# Patient Record
Sex: FEMALE | Race: WHITE | NOT HISPANIC OR LATINO | Employment: UNEMPLOYED | URBAN - NONMETROPOLITAN AREA
[De-identification: names, ages, dates, MRNs, and addresses within clinical notes are randomized per-mention and may not be internally consistent; named-entity substitution may affect disease eponyms.]

---

## 2017-07-07 ENCOUNTER — APPOINTMENT (OUTPATIENT)
Dept: MEDICAL GROUP | Facility: PHYSICIAN GROUP | Age: 57
End: 2017-07-07
Payer: MEDICAID

## 2017-10-09 ENCOUNTER — OFFICE VISIT (OUTPATIENT)
Dept: CARDIOLOGY | Facility: PHYSICIAN GROUP | Age: 57
End: 2017-10-09
Payer: MEDICAID

## 2017-10-09 VITALS
SYSTOLIC BLOOD PRESSURE: 94 MMHG | HEIGHT: 63 IN | HEART RATE: 78 BPM | BODY MASS INDEX: 20.2 KG/M2 | DIASTOLIC BLOOD PRESSURE: 60 MMHG | OXYGEN SATURATION: 100 % | WEIGHT: 114 LBS

## 2017-10-09 DIAGNOSIS — R55 SYNCOPE AND COLLAPSE: Chronic | ICD-10-CM

## 2017-10-09 DIAGNOSIS — Z72.0 TOBACCO USE: Chronic | ICD-10-CM

## 2017-10-09 DIAGNOSIS — R00.2 PALPITATIONS: ICD-10-CM

## 2017-10-09 DIAGNOSIS — I45.6 PRE-EXCITATION ATRIOVENTRICULAR CONDUCTION: Chronic | ICD-10-CM

## 2017-10-09 PROCEDURE — 99244 OFF/OP CNSLTJ NEW/EST MOD 40: CPT | Performed by: INTERNAL MEDICINE

## 2017-10-09 RX ORDER — CETIRIZINE HYDROCHLORIDE 10 MG/1
TABLET ORAL
COMMUNITY
Start: 2017-10-05

## 2017-10-09 RX ORDER — BUTALBITAL, ACETAMINOPHEN AND CAFFEINE 300; 40; 50 MG/1; MG/1; MG/1
CAPSULE ORAL
COMMUNITY
Start: 2014-12-03

## 2017-10-09 RX ORDER — ALENDRONATE SODIUM 70 MG/1
TABLET ORAL
COMMUNITY
Start: 2017-10-05

## 2017-10-09 RX ORDER — PROPRANOLOL HYDROCHLORIDE 20 MG/1
TABLET ORAL
COMMUNITY
Start: 2017-08-15

## 2017-10-09 RX ORDER — ALPRAZOLAM 0.25 MG/1
TABLET ORAL
COMMUNITY
Start: 2017-08-15

## 2017-10-09 RX ORDER — ATORVASTATIN CALCIUM 40 MG/1
TABLET, FILM COATED ORAL
COMMUNITY
Start: 2017-09-15

## 2017-10-09 RX ORDER — OXYBUTYNIN CHLORIDE 5 MG/1
2.5 TABLET ORAL
COMMUNITY

## 2017-10-09 RX ORDER — AMITRIPTYLINE HYDROCHLORIDE 10 MG/1
TABLET, FILM COATED ORAL
COMMUNITY
Start: 2017-09-15

## 2017-10-09 RX ORDER — NORTRIPTYLINE HYDROCHLORIDE 25 MG/1
25 CAPSULE ORAL
COMMUNITY
Start: 2015-03-02

## 2017-10-09 RX ORDER — HYDROCODONE BITARTRATE AND ACETAMINOPHEN 5; 325 MG/1; MG/1
TABLET ORAL
COMMUNITY
Start: 2017-09-15

## 2017-10-09 RX ORDER — ALBUTEROL SULFATE 90 MCG
HFA AEROSOL WITH ADAPTER (GRAM) INHALATION
COMMUNITY
Start: 2017-10-05

## 2017-10-09 RX ORDER — LACTULOSE 10 G/15ML
SOLUTION ORAL; RECTAL
COMMUNITY
Start: 2017-10-05

## 2017-10-09 RX ORDER — OXYBUTYNIN CHLORIDE 5 MG/1
TABLET ORAL
COMMUNITY
Start: 2017-10-05

## 2017-10-09 ASSESSMENT — ENCOUNTER SYMPTOMS
PHOTOPHOBIA: 1
COUGH: 0
PND: 0
NECK PAIN: 1
HEARTBURN: 1
NAUSEA: 1
BLURRED VISION: 1
WEIGHT LOSS: 1
BACK PAIN: 1
FOCAL WEAKNESS: 0
CHILLS: 0
CLAUDICATION: 0
FALLS: 1
BRUISES/BLEEDS EASILY: 0
SORE THROAT: 0
DIZZINESS: 1
CONSTIPATION: 1
WEAKNESS: 1
FEVER: 0
HEADACHES: 1
PALPITATIONS: 0
ABDOMINAL PAIN: 0
POLYDIPSIA: 1
LOSS OF CONSCIOUSNESS: 1
SHORTNESS OF BREATH: 0

## 2017-10-09 NOTE — LETTER
[facility] [address]  Phone: [phone]  Fax: [phone]              Deirdre Benton  1960    Encounter Date: 10/9/2017    Miguel Hagan M.D.          PROGRESS NOTE:  Subjective:   Deirdre Benton is a 57 y.o. female who presents today In consultation per Dr. Ferrer's office for evaluation of what she describes a loss of conscious for a couple of days.  Unfortunately she has some confusion on what been evaluated in the past and what her presentation was but had reported that she felt she passed out for a couple days and then had some slurred speech she believes she had an MRI of the brain does not know the results she was recommended to start atorvastatin and metoprolol recent office visit but does not recall his medications despite being on her medication list I believe she did not fill those.    She continues to have a little bit of confusion about her overall events but reports that her neurologic status is back to baseline    [place] is [place] her testing is requiring her to travel some distance    Past Medical History:   Diagnosis Date   • Meniere's disease of both ears 1990   • Arthritis    • Dental disorder     dentures   • EMPHYSEMA    • Pre-excitation atrioventricular conduction    • Syncope and collapse    • Tobacco use    • Unspecified urinary incontinence      Past Surgical History:   Procedure Laterality Date   • PTOSIS REPAIR  12/14/2012    Performed by Kiel Prado M.D. at [facility]   • TENSILON TEST  11/10/2011    Performed by ARTHUR FIELDS at [facility]   • GYN SURGERY  1995    hysterectomy   • OTHER      T & A   • OTHER      rectal surgery   • OTHER      cyst removed x2     Family History   Problem Relation Age of Onset   • Heart Disease Neg Hx      History   Smoking Status   • Current Every Day Smoker   •  Packs/day: 0.25   • Years: 38.00   • Types: Cigarettes   Smokeless Tobacco   • Never Used     Allergies   Allergen Reactions   • Diazepam      Other reaction(s): Other (See Comments)  COMA   • Pcn [Penicillins] Hives   • Codeine Vomiting     Outpatient Encounter Prescriptions as of 10/9/2017   Medication Sig Dispense Refill   • nortriptyline (PAMELOR) 25 MG Cap Take 25 mg by mouth.     • acetaminophen/caffeine/butalbital 300-40-50 mg (FIORICET) -40 MG Cap capsule Take  by mouth.     • oxybutynin (DITROPAN) 5 MG Tab Take 2.5 mg by mouth.     • PROVENTIL  (90 Base) MCG/ACT Aero Soln inhalation aerosol      • lactulose 10 GM/15ML Solution      • NEXIUM 40 MG delayed-release capsule      • cetirizine (ZYRTEC) 10 MG Tab      • oxybutynin (DITROPAN) 5 MG Tab      • alendronate (FOSAMAX) 70 MG Tab      • atorvastatin (LIPITOR) 40 MG Tab      • metoprolol (LOPRESSOR) 25 MG Tab      • amitriptyline (ELAVIL) 10 MG Tab      • hydrocodone-acetaminophen (NORCO) 5-325 MG Tab per tablet      • aspirin EC (ECOTRIN) 81 MG Tablet Delayed Response      • propranolol (INDERAL) 20 MG Tab      • alprazolam (XANAX) 0.25 MG Tab      • meloxicam (MOBIC) 7.5 MG TABS Take 15 mg by mouth every day.     • gabapentin (NEURONTIN) 100 MG CAPS Take 100 mg by mouth every evening.     • hydrocodone-acetaminophen (VICODIN) 5-500 MG TABS Take 1 Tab by mouth every four hours as needed.     • acetaminophen/caffeine/butalbital 325-40-50 mg (FIORICET) -40 MG TABS Take 1 Tab by mouth every four hours as needed.     • [DISCONTINUED] nortriptyline (PAMELOR) 25 MG CAPS Take 25 mg by mouth every evening.     • [DISCONTINUED] Non Formulary Request Take 60 mg by mouth every day.       No facility-administered encounter medications on file as of 10/9/2017.      Review of Systems   Constitutional: Positive for malaise/fatigue and weight loss. Negative for chills and fever.   HENT: Positive for hearing loss and tinnitus. Negative for sore throat.   "  Eyes: Positive for blurred vision and photophobia.   Respiratory: Negative for cough and shortness of breath.    Cardiovascular: Positive for chest pain and leg swelling. Negative for palpitations, claudication and PND.   Gastrointestinal: Positive for constipation, heartburn and nausea. Negative for abdominal pain.   Musculoskeletal: Positive for back pain, falls and neck pain. Negative for joint pain.   Skin: Negative for rash.   Neurological: Positive for dizziness, loss of consciousness, weakness and headaches. Negative for focal weakness.   Endo/Heme/Allergies: Positive for environmental allergies and polydipsia. Does not bruise/bleed easily.        Objective:   BP (!) 94/60   Pulse 78   Ht 1.6 m (5' 3\")   Wt 51.7 kg (114 lb)   SpO2 100%   BMI 20.19 kg/m²      Physical Exam   Constitutional: No distress.   HENT:   Mouth/Throat: Oropharynx is clear and moist.   Eyes: No scleral icterus.   Cardiovascular: Normal rate, regular rhythm and intact distal pulses.  Exam reveals no gallop and no friction rub.    No murmur heard.  Pulmonary/Chest: Effort normal and breath sounds normal. She has no rales.   Abdominal: Bowel sounds are normal. There is no tenderness.   Musculoskeletal: She exhibits no edema.   Neurological: She is alert.   Skin: No rash noted. She is not diaphoretic.   Psychiatric: She has a normal mood and affect.     We reviewed her labs and EKGs from the primary care office visit suggest short IN interval with likely preexcitation delta wave    Assessment:     1. Pre-excitation atrioventricular conduction  ECHOCARDIOGRAM COMP W/O CONT    HOLTER MONITOR STUDY   2. Syncope and collapse  ECHOCARDIOGRAM COMP W/O CONT    HOLTER MONITOR STUDY   3. Tobacco use     4. Palpitations  ECHOCARDIOGRAM COMP W/O CONT    HOLTER MONITOR STUDY       Medical Decision Making:  Today's Assessment / Status / Plan:     It was my pleasure to meet with Ms. Benton.    Overall seems her symptoms are most consistent with a " stroke, unclear if she had neuro imaging in a encouraged her follow up with her primary care provider    Repeat EKG actually suggests she may have preexcitation so we will do an ultrasound monitor to make sure she doesn't have significant arrhythmia, depending on the results that we could have her see electrophysiology    I encouraged her to start taking the atorvastatin given her risk factors with stroke    Encouraged her to stop smoking    We will follow up over the phone on the results of her testing to determine further follow-up from there    It is my pleasure to participate in the care of Ms. Benton.  Please do not hesitate to contact me with questions or concerns.    Miguel Hagan MD PhD FACC  Cardiologist [facility]        Jane Frances M.D.  [address] [place] [id number]  VIA Facsimile: [phone]

## 2017-10-09 NOTE — PROGRESS NOTES
Subjective:   Deirdre Benton is a 57 y.o. female who presents today In consultation per Dr. Ferrer's office for evaluation of what she describes a loss of conscious for a couple of days.  Unfortunately she has some confusion on what been evaluated in the past and what her presentation was but had reported that she felt she passed out for a couple days and then had some slurred speech she believes she had an MRI of the brain does not know the results she was recommended to start atorvastatin and metoprolol recent office visit but does not recall his medications despite being on her medication list I believe she did not fill those.    She continues to have a little bit of confusion about her overall events but reports that her neurologic status is back to baseline    [place] her testing is requiring her to travel some distance    Past Medical History:   Diagnosis Date   • Meniere's disease of both ears 1990   • Arthritis    • Dental disorder     dentures   • EMPHYSEMA    • Pre-excitation atrioventricular conduction    • Syncope and collapse    • Tobacco use    • Unspecified urinary incontinence      Past Surgical History:   Procedure Laterality Date   • PTOSIS REPAIR  12/14/2012    Performed by Kiel Prado M.D. at [facility]   • TENSILON TEST  11/10/2011    Performed by ARTHUR FIELDS at [facility]   • GYN SURGERY  1995    hysterectomy   • OTHER      T & A   • OTHER      rectal surgery   • OTHER      cyst removed x2     Family History   Problem Relation Age of Onset   • Heart Disease Neg Hx      History   Smoking Status   • Current Every Day Smoker   • Packs/day: 0.25   • Years: 38.00   • Types: Cigarettes   Smokeless Tobacco   • Never Used     Allergies   Allergen Reactions   • Diazepam      Other reaction(s): Other (See Comments)  COMA   • Pcn [Penicillins] Hives   • Codeine Vomiting     Outpatient Encounter Prescriptions as of  10/9/2017   Medication Sig Dispense Refill   • nortriptyline (PAMELOR) 25 MG Cap Take 25 mg by mouth.     • acetaminophen/caffeine/butalbital 300-40-50 mg (FIORICET) -40 MG Cap capsule Take  by mouth.     • oxybutynin (DITROPAN) 5 MG Tab Take 2.5 mg by mouth.     • PROVENTIL  (90 Base) MCG/ACT Aero Soln inhalation aerosol      • lactulose 10 GM/15ML Solution      • NEXIUM 40 MG delayed-release capsule      • cetirizine (ZYRTEC) 10 MG Tab      • oxybutynin (DITROPAN) 5 MG Tab      • alendronate (FOSAMAX) 70 MG Tab      • atorvastatin (LIPITOR) 40 MG Tab      • metoprolol (LOPRESSOR) 25 MG Tab      • amitriptyline (ELAVIL) 10 MG Tab      • hydrocodone-acetaminophen (NORCO) 5-325 MG Tab per tablet      • aspirin EC (ECOTRIN) 81 MG Tablet Delayed Response      • propranolol (INDERAL) 20 MG Tab      • alprazolam (XANAX) 0.25 MG Tab      • meloxicam (MOBIC) 7.5 MG TABS Take 15 mg by mouth every day.     • gabapentin (NEURONTIN) 100 MG CAPS Take 100 mg by mouth every evening.     • hydrocodone-acetaminophen (VICODIN) 5-500 MG TABS Take 1 Tab by mouth every four hours as needed.     • acetaminophen/caffeine/butalbital 325-40-50 mg (FIORICET) -40 MG TABS Take 1 Tab by mouth every four hours as needed.     • [DISCONTINUED] nortriptyline (PAMELOR) 25 MG CAPS Take 25 mg by mouth every evening.     • [DISCONTINUED] Non Formulary Request Take 60 mg by mouth every day.       No facility-administered encounter medications on file as of 10/9/2017.      Review of Systems   Constitutional: Positive for malaise/fatigue and weight loss. Negative for chills and fever.   HENT: Positive for hearing loss and tinnitus. Negative for sore throat.    Eyes: Positive for blurred vision and photophobia.   Respiratory: Negative for cough and shortness of breath.    Cardiovascular: Positive for chest pain and leg swelling. Negative for palpitations, claudication and PND.   Gastrointestinal: Positive for constipation, heartburn and  "nausea. Negative for abdominal pain.   Musculoskeletal: Positive for back pain, falls and neck pain. Negative for joint pain.   Skin: Negative for rash.   Neurological: Positive for dizziness, loss of consciousness, weakness and headaches. Negative for focal weakness.   Endo/Heme/Allergies: Positive for environmental allergies and polydipsia. Does not bruise/bleed easily.        Objective:   BP (!) 94/60   Pulse 78   Ht 1.6 m (5' 3\")   Wt 51.7 kg (114 lb)   SpO2 100%   BMI 20.19 kg/m²     Physical Exam   Constitutional: No distress.   HENT:   Mouth/Throat: Oropharynx is clear and moist.   Eyes: No scleral icterus.   Cardiovascular: Normal rate, regular rhythm and intact distal pulses.  Exam reveals no gallop and no friction rub.    No murmur heard.  Pulmonary/Chest: Effort normal and breath sounds normal. She has no rales.   Abdominal: Bowel sounds are normal. There is no tenderness.   Musculoskeletal: She exhibits no edema.   Neurological: She is alert.   Skin: No rash noted. She is not diaphoretic.   Psychiatric: She has a normal mood and affect.     We reviewed her labs and EKGs from the primary care office visit suggest short CA interval with likely preexcitation delta wave    Assessment:     1. Pre-excitation atrioventricular conduction  ECHOCARDIOGRAM COMP W/O CONT    HOLTER MONITOR STUDY   2. Syncope and collapse  ECHOCARDIOGRAM COMP W/O CONT    HOLTER MONITOR STUDY   3. Tobacco use     4. Palpitations  ECHOCARDIOGRAM COMP W/O CONT    HOLTER MONITOR STUDY       Medical Decision Making:  Today's Assessment / Status / Plan:     It was my pleasure to meet with Ms. Benton.    Overall seems her symptoms are most consistent with a stroke, unclear if she had neuro imaging in a encouraged her follow up with her primary care provider    Repeat EKG actually suggests she may have preexcitation so we will do an ultrasound monitor to make sure she doesn't have significant arrhythmia, depending on the results that we " could have her see electrophysiology    I encouraged her to start taking the atorvastatin given her risk factors with stroke    Encouraged her to stop smoking    We will follow up over the phone on the results of her testing to determine further follow-up from there    It is my pleasure to participate in the care of Ms. Benton.  Please do not hesitate to contact me with questions or concerns.    Miguel Hagan MD PhD FACC  Cardiologist [facility]

## 2017-11-01 ENCOUNTER — TELEPHONE (OUTPATIENT)
Dept: CARDIOLOGY | Facility: MEDICAL CENTER | Age: 57
End: 2017-11-01

## 2017-11-01 NOTE — TELEPHONE ENCOUNTER
----- Message from Miguel Hagan M.D. sent at 10/31/2017  5:25 PM PDT -----  The holter test looks okay, it would be best for her to see EP to be 100% sure, but that is down in [place].  please let her know     Thank you

## 2017-11-01 NOTE — TELEPHONE ENCOUNTER
Patient informed of Holter results and recommendation to see EP.  She agrees.  To [facility] to schedule EP consult for preexcitation and syncope (patient states she was passed out for 2 days).

## 2017-11-02 ENCOUNTER — TELEPHONE (OUTPATIENT)
Dept: CARDIOLOGY | Facility: MEDICAL CENTER | Age: 57
End: 2017-11-02